# Patient Record
Sex: MALE | Race: WHITE
[De-identification: names, ages, dates, MRNs, and addresses within clinical notes are randomized per-mention and may not be internally consistent; named-entity substitution may affect disease eponyms.]

---

## 2017-11-27 LAB
ANION GAP SERPL CALC-SCNC: 11 MMOL/L (ref 8–16)
BUN SERPL-MCNC: 12 MG/DL (ref 7–18)
CALCIUM SERPL-MCNC: 8.4 MG/DL (ref 8.5–10.1)
CHLORIDE SERPL-SCNC: 101 MMOL/L (ref 98–107)
CO2 SERPL-SCNC: 26 MMOL/L (ref 21–32)
CREAT SERPL-MCNC: 1.3 MG/DL (ref 0.6–1.3)
ERYTHROCYTE [DISTWIDTH] IN BLOOD BY AUTOMATED COUNT: 14 % (ref 11.5–14.5)
GLUCOSE SERPL-MCNC: 99 MG/DL (ref 74–106)
HCT VFR BLD CALC: 44.1 % (ref 42–54)
HGB BLD-MCNC: 14.9 G/DL (ref 13.5–17.5)
MCH RBC QN AUTO: 30.7 PG (ref 26–34)
MCHC RBC AUTO-ENTMCNC: 33.8 G/DL (ref 31–37)
MCV RBC: 90.9 FL (ref 80–100)
OSMOLALITY SERPL CALC.SUM OF ELEC: 267 MOSM/KG (ref 275–300)
PLATELET # BLD: 347 10X3/UL (ref 130–400)
PMV BLD AUTO: 9.5 FL (ref 7.4–10.4)
POTASSIUM SERPL-SCNC: 4 MMOL/L (ref 3.5–5.1)
RBC # BLD AUTO: 4.85 10X6/UL (ref 4.2–6.1)
SODIUM SERPL-SCNC: 134 MMOL/L (ref 136–145)
WBC # BLD AUTO: 5.7 10X3/UL (ref 4.8–10.8)

## 2017-11-28 ENCOUNTER — HOSPITAL ENCOUNTER (INPATIENT)
Dept: HOSPITAL 84 - D.SDCHOLD | Age: 57
LOS: 9 days | Discharge: HOME | DRG: 331 | End: 2017-12-07
Attending: SURGERY | Admitting: SURGERY
Payer: COMMERCIAL

## 2017-11-28 VITALS
BODY MASS INDEX: 26.82 KG/M2 | BODY MASS INDEX: 26.82 KG/M2 | BODY MASS INDEX: 26.82 KG/M2 | HEIGHT: 74 IN | WEIGHT: 209 LBS | BODY MASS INDEX: 26.82 KG/M2

## 2017-11-28 VITALS — SYSTOLIC BLOOD PRESSURE: 125 MMHG | DIASTOLIC BLOOD PRESSURE: 83 MMHG

## 2017-11-28 VITALS — DIASTOLIC BLOOD PRESSURE: 74 MMHG | SYSTOLIC BLOOD PRESSURE: 120 MMHG

## 2017-11-28 VITALS — DIASTOLIC BLOOD PRESSURE: 87 MMHG | SYSTOLIC BLOOD PRESSURE: 110 MMHG

## 2017-11-28 VITALS — DIASTOLIC BLOOD PRESSURE: 78 MMHG | SYSTOLIC BLOOD PRESSURE: 95 MMHG

## 2017-11-28 VITALS — SYSTOLIC BLOOD PRESSURE: 137 MMHG | DIASTOLIC BLOOD PRESSURE: 88 MMHG

## 2017-11-28 VITALS — SYSTOLIC BLOOD PRESSURE: 109 MMHG | DIASTOLIC BLOOD PRESSURE: 73 MMHG

## 2017-11-28 VITALS — SYSTOLIC BLOOD PRESSURE: 109 MMHG | DIASTOLIC BLOOD PRESSURE: 71 MMHG

## 2017-11-28 VITALS — DIASTOLIC BLOOD PRESSURE: 80 MMHG | SYSTOLIC BLOOD PRESSURE: 111 MMHG

## 2017-11-28 VITALS — SYSTOLIC BLOOD PRESSURE: 132 MMHG | DIASTOLIC BLOOD PRESSURE: 85 MMHG
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
## 2017-11-28 DIAGNOSIS — K94.13: Primary | ICD-10-CM

## 2017-11-28 DIAGNOSIS — K66.0: ICD-10-CM

## 2017-11-28 DIAGNOSIS — R00.0: ICD-10-CM

## 2017-11-28 LAB
ANION GAP SERPL CALC-SCNC: 20.7 MMOL/L (ref 8–16)
BASOPHILS NFR BLD AUTO: 0.1 % (ref 0–2)
BASOPHILS NFR BLD AUTO: 0.1 % (ref 0–2)
BUN SERPL-MCNC: 8 MG/DL (ref 7–18)
CALCIUM SERPL-MCNC: 7.1 MG/DL (ref 8.5–10.1)
CHLORIDE SERPL-SCNC: 103 MMOL/L (ref 98–107)
CO2 SERPL-SCNC: 14.7 MMOL/L (ref 21–32)
CREAT SERPL-MCNC: 1.1 MG/DL (ref 0.6–1.3)
EOSINOPHIL NFR BLD: 0 % (ref 0–7)
EOSINOPHIL NFR BLD: 0.2 % (ref 0–7)
ERYTHROCYTE [DISTWIDTH] IN BLOOD BY AUTOMATED COUNT: 14.4 % (ref 11.5–14.5)
ERYTHROCYTE [DISTWIDTH] IN BLOOD BY AUTOMATED COUNT: 15.2 % (ref 11.5–14.5)
GLUCOSE SERPL-MCNC: 92 MG/DL (ref 74–106)
HCT VFR BLD CALC: 32.4 % (ref 42–54)
HCT VFR BLD CALC: 43.1 % (ref 42–54)
HGB BLD-MCNC: 10.4 G/DL (ref 13.5–17.5)
HGB BLD-MCNC: 14.3 G/DL (ref 13.5–17.5)
IMM GRANULOCYTES NFR BLD: 0.1 % (ref 0–5)
IMM GRANULOCYTES NFR BLD: 0.2 % (ref 0–5)
LYMPHOCYTES NFR BLD AUTO: 22.9 % (ref 15–50)
LYMPHOCYTES NFR BLD AUTO: 5.6 % (ref 15–50)
MCH RBC QN AUTO: 30 PG (ref 26–34)
MCH RBC QN AUTO: 30.3 PG (ref 26–34)
MCHC RBC AUTO-ENTMCNC: 32.1 G/DL (ref 31–37)
MCHC RBC AUTO-ENTMCNC: 33.2 G/DL (ref 31–37)
MCV RBC: 91.3 FL (ref 80–100)
MCV RBC: 93.4 FL (ref 80–100)
MONOCYTES NFR BLD: 3.7 % (ref 2–11)
MONOCYTES NFR BLD: 8.6 % (ref 2–11)
NEUTROPHILS NFR BLD AUTO: 72.9 % (ref 40–80)
NEUTROPHILS NFR BLD AUTO: 85.6 % (ref 40–80)
OSMOLALITY SERPL CALC.SUM OF ELEC: 265 MOSM/KG (ref 275–300)
PLATELET # BLD: 248 10X3/UL (ref 130–400)
PLATELET # BLD: 248 10X3/UL (ref 130–400)
PMV BLD AUTO: 10 FL (ref 7.4–10.4)
PMV BLD AUTO: 9.6 FL (ref 7.4–10.4)
POTASSIUM SERPL-SCNC: 4.4 MMOL/L (ref 3.5–5.1)
RBC # BLD AUTO: 3.47 10X6/UL (ref 4.2–6.1)
RBC # BLD AUTO: 4.72 10X6/UL (ref 4.2–6.1)
SODIUM SERPL-SCNC: 134 MMOL/L (ref 136–145)
WBC # BLD AUTO: 8.6 10X3/UL (ref 4.8–10.8)
WBC # BLD AUTO: 8.8 10X3/UL (ref 4.8–10.8)

## 2017-11-28 PROCEDURE — 0DQB0ZZ REPAIR ILEUM, OPEN APPROACH: ICD-10-PCS | Performed by: SURGERY

## 2017-11-28 PROCEDURE — 0D1B0Z4 BYPASS ILEUM TO CUTANEOUS, OPEN APPROACH: ICD-10-PCS | Performed by: SURGERY

## 2017-11-28 PROCEDURE — 0DNW0ZZ RELEASE PERITONEUM, OPEN APPROACH: ICD-10-PCS | Performed by: SURGERY

## 2017-11-28 NOTE — NUR
AISHA CONSULTED NUMEROUS TIMES ABOUT THE PATIENTS BP AND HEART
RATE. DR CERON ALSO AT BEDSIDE CHECKING ON THE PATIENT

## 2017-11-28 NOTE — NUR
DR LINTON HERE AT BEDSIDE ASSESING THE PATIENTS HEART RATE AND VITAL
SIGNS. DR LINTON ADMIN ESMOLOL 30MG IN RECOVERY 1750

## 2017-11-28 NOTE — NUR
PT ARRIVED TO ROOM AT 1810 FROM RECOVERY ROOM, , /86
SPO2 97% ON 3L NC, TEMP 98.2 ORALLY, HR SINUS TACH, PT ALERT AND ABLE TO VOICE
ALL NEEDS, RR EVEN AND UNLABORED COMPLAINING OF PAIN, RR NURSE STATED HE WOULD
TALK TO BREVING ABOUT PAIN MEDICATION, ABD DRESSINGS CDI, CESAR DRAIN COMPRESSED
WITH BLOODY DRAINAGE AND STOMA PINK WITH BAG IN PLACE, SCDS IN PLACE, PIV TO
RIGHT AND LEFT HAND BOTH PATENT, SIFUENTES DRAINING GREEN, RR STATED HE CAME FROM
[facility] WITH 330ML IN BAG AND WAS NOT EMPTIED AND NO UOP IN RR, NGT LIS WITH NO
OUTPUT IN RR, WILL CONTINUE TO MONITOR

## 2017-11-28 NOTE — NUR
DR CERON AT BEDSIDE GIVEN UPDATE.  UNCHANGED FROM ATIVAN ADM. ONE TIME
DOSE T ORDER FOR 5MG LOPRESSOR IV TO BE ADM. 50 G ALBUMIN TO BE ADM. WILL
CONTINUE TO MONITOR

## 2017-11-29 VITALS — SYSTOLIC BLOOD PRESSURE: 110 MMHG | DIASTOLIC BLOOD PRESSURE: 70 MMHG

## 2017-11-29 VITALS — SYSTOLIC BLOOD PRESSURE: 111 MMHG | DIASTOLIC BLOOD PRESSURE: 76 MMHG

## 2017-11-29 VITALS — DIASTOLIC BLOOD PRESSURE: 73 MMHG | SYSTOLIC BLOOD PRESSURE: 103 MMHG

## 2017-11-29 VITALS — DIASTOLIC BLOOD PRESSURE: 72 MMHG | SYSTOLIC BLOOD PRESSURE: 106 MMHG

## 2017-11-29 VITALS — DIASTOLIC BLOOD PRESSURE: 73 MMHG | SYSTOLIC BLOOD PRESSURE: 99 MMHG

## 2017-11-29 VITALS — SYSTOLIC BLOOD PRESSURE: 113 MMHG | DIASTOLIC BLOOD PRESSURE: 70 MMHG

## 2017-11-29 VITALS — SYSTOLIC BLOOD PRESSURE: 109 MMHG | DIASTOLIC BLOOD PRESSURE: 69 MMHG

## 2017-11-29 VITALS — DIASTOLIC BLOOD PRESSURE: 76 MMHG | SYSTOLIC BLOOD PRESSURE: 101 MMHG

## 2017-11-29 VITALS — SYSTOLIC BLOOD PRESSURE: 122 MMHG | DIASTOLIC BLOOD PRESSURE: 76 MMHG

## 2017-11-29 VITALS — SYSTOLIC BLOOD PRESSURE: 110 MMHG | DIASTOLIC BLOOD PRESSURE: 75 MMHG

## 2017-11-29 VITALS — SYSTOLIC BLOOD PRESSURE: 125 MMHG | DIASTOLIC BLOOD PRESSURE: 78 MMHG

## 2017-11-29 VITALS — SYSTOLIC BLOOD PRESSURE: 113 MMHG | DIASTOLIC BLOOD PRESSURE: 79 MMHG

## 2017-11-29 VITALS — SYSTOLIC BLOOD PRESSURE: 119 MMHG | DIASTOLIC BLOOD PRESSURE: 73 MMHG

## 2017-11-29 VITALS — SYSTOLIC BLOOD PRESSURE: 112 MMHG | DIASTOLIC BLOOD PRESSURE: 72 MMHG

## 2017-11-29 VITALS — SYSTOLIC BLOOD PRESSURE: 114 MMHG | DIASTOLIC BLOOD PRESSURE: 75 MMHG

## 2017-11-29 VITALS — DIASTOLIC BLOOD PRESSURE: 69 MMHG | SYSTOLIC BLOOD PRESSURE: 123 MMHG

## 2017-11-29 VITALS — SYSTOLIC BLOOD PRESSURE: 122 MMHG | DIASTOLIC BLOOD PRESSURE: 85 MMHG

## 2017-11-29 LAB
ALBUMIN SERPL-MCNC: 3.2 G/DL (ref 3.4–5)
ALP SERPL-CCNC: 31 U/L (ref 46–116)
ALT SERPL-CCNC: 22 U/L (ref 10–68)
ANION GAP SERPL CALC-SCNC: 13.3 MMOL/L (ref 8–16)
BASOPHILS NFR BLD AUTO: 0 % (ref 0–2)
BILIRUB SERPL-MCNC: 1.59 MG/DL (ref 0.2–1.3)
BUN SERPL-MCNC: 14 MG/DL (ref 7–18)
CALCIUM SERPL-MCNC: 8.4 MG/DL (ref 8.5–10.1)
CHLORIDE SERPL-SCNC: 103 MMOL/L (ref 98–107)
CO2 SERPL-SCNC: 24.4 MMOL/L (ref 21–32)
CREAT SERPL-MCNC: 1.9 MG/DL (ref 0.6–1.3)
EOSINOPHIL NFR BLD: 0 % (ref 0–7)
ERYTHROCYTE [DISTWIDTH] IN BLOOD BY AUTOMATED COUNT: 15.3 % (ref 11.5–14.5)
GLOBULIN SER-MCNC: 2.7 G/L
GLUCOSE SERPL-MCNC: 113 MG/DL (ref 74–106)
HCT VFR BLD CALC: 37.8 % (ref 42–54)
HGB BLD-MCNC: 12.5 G/DL (ref 13.5–17.5)
IMM GRANULOCYTES NFR BLD: 0.2 % (ref 0–5)
LYMPHOCYTES NFR BLD AUTO: 3.9 % (ref 15–50)
MAGNESIUM SERPL-MCNC: 1.3 MG/DL (ref 1.8–2.4)
MCH RBC QN AUTO: 29.9 PG (ref 26–34)
MCHC RBC AUTO-ENTMCNC: 33.1 G/DL (ref 31–37)
MCV RBC: 90.4 FL (ref 80–100)
MONOCYTES NFR BLD: 7.7 % (ref 2–11)
NEUTROPHILS NFR BLD AUTO: 88.2 % (ref 40–80)
OSMOLALITY SERPL CALC.SUM OF ELEC: 273 MOSM/KG (ref 275–300)
PHOSPHATE SERPL-MCNC: 3.8 MG/DL (ref 2.5–4.9)
PLATELET # BLD: 265 10X3/UL (ref 130–400)
PMV BLD AUTO: 10.1 FL (ref 7.4–10.4)
POTASSIUM SERPL-SCNC: 4.7 MMOL/L (ref 3.5–5.1)
PROT SERPL-MCNC: 5.9 G/DL (ref 6.4–8.2)
RBC # BLD AUTO: 4.18 10X6/UL (ref 4.2–6.1)
SODIUM SERPL-SCNC: 136 MMOL/L (ref 136–145)
WBC # BLD AUTO: 14.5 10X3/UL (ref 4.8–10.8)

## 2017-11-29 NOTE — NUR
1600-PT FAMILY AT THE BEDSIDE, ASKING IF PT CAN HAVE ICE CHIPS, INFORMED THEM
THAT AS OF THIS TIME, PT IS NPO, WILL CALL DR. CERON AND ASK.
1615-DR. CERON RETURNING PAGE, "OKAY TO HAVE ICE."

## 2017-11-29 NOTE — NUR
PT RESTING WITH EYES CLOSED, PAIN CONTROLLED BY PCA PUMP, REASSESSMENT
COMPLETED, SEE FLOW SHEET. ROOM FREE OF CLUTTER, BED LOCKED IN LOWEST
POSITION, CALL LIGHT IN REACH, WILL CONTINUE TO MONITOR PT.

## 2017-11-29 NOTE — NUR
REC'D LYING IN BED. ALERT AND ORIENTED X4. DENIED PAIN AT THIS TIME. DENIED
NEEDS AT THIS TIME. WIFE IS WANTING TO KNOW WHEN THE NGT CAN COME OUT. HAS CESAR
TO ABD, AND MIDLINE DRESSING. INSTRUSTED TO CALL IF NEEDED ANYTHING,
VERBALIZED UNDERSTANDING. NO DISTRESS NOTED. BED LOW, LOCKED, CALL LIGHT IN
REACH. WILL CONT TO MONITOR.

## 2017-11-29 NOTE — NUR
PT REPORT REC'D, PT CARE ASSUMED. PT AAOX4 SITTING UP IN BED, PT C/O PAIN
RATED "6/10", PT HAS PCA PUMP "THAT HELPS WITH THE PAIN." INSTRUCTED ON USE OF
PCA TO CONTROL PAIN, PT VERBALIZED AND DEMONSTRATED UNDERSTANDING OF PCA PUMP.
RIGHT HAND PIV WITH FLUIDS INFUSING, SEE FLOW SHEET, DRESSING CDI. LEFT WRIST
PIV S/L'ED, DRESSING CDI, NO REDDNESS OR SIGNS OF INFILTRATION. MIDABDOMINAL
INCISION, DRESSING CDI. COLOSTOMY WITH BLOODY DRAIANGE NOTED. CESAR DRAIN
COMPRESSED WITH BLOODY DRAINAGE, DRESSING CDI. SIFUENTES CATHETER FREE OF KINKS TO
GRAVITY WITH BLUE/GREEN URINE RETURN. SCD'S. SHIFT ASSESSMENT COMPLETED, SEE
FLOW SHEET. ROOM FREE OF CLUTTER, CALL LIGHT IN REACH, BED LOCKED IN LOWEST
POSITION, BED ALARM ACTIVE, WILL CONTINUE TO MONITOR PT.

## 2017-11-30 VITALS — SYSTOLIC BLOOD PRESSURE: 113 MMHG | DIASTOLIC BLOOD PRESSURE: 72 MMHG

## 2017-11-30 VITALS — SYSTOLIC BLOOD PRESSURE: 117 MMHG | DIASTOLIC BLOOD PRESSURE: 68 MMHG

## 2017-11-30 VITALS — SYSTOLIC BLOOD PRESSURE: 124 MMHG | DIASTOLIC BLOOD PRESSURE: 80 MMHG

## 2017-11-30 VITALS — SYSTOLIC BLOOD PRESSURE: 124 MMHG | DIASTOLIC BLOOD PRESSURE: 75 MMHG

## 2017-11-30 VITALS — SYSTOLIC BLOOD PRESSURE: 128 MMHG | DIASTOLIC BLOOD PRESSURE: 76 MMHG

## 2017-11-30 NOTE — NUR
PATIENT IN BED WITH EYES CLOSED RESTING QUIETLY.IV AND NGT INTACT. NO
COMPLAINTS. CALL LIGHT WITHIN REACH.

## 2017-11-30 NOTE — NUR
RECIEVED REPORT ANS CARE ASSUMED RESTIN IN BED CALL LIGHT IN REACH SIDE RAILS
UP X 2 NGT TO LIS NO OUTPUT AT THIS TIME.

## 2017-11-30 NOTE — NUR
PT LYING IN BED HEAD LIFTED A BIT, NGT IN LEFT NARE, EYES CLOSED EVEN RISE AND
FALL OF CHEST. PT SP AT BEDSIDE. CONTINUE WITH PLAN OF CARE

## 2017-11-30 NOTE — NUR
PT CALL LIGHT ON STATED COUGHING UP WHAT LOOKS LIKE COAGULATED BLOOD, WANTS TO
KNOW WHEN NGT CAN COME OUT. NO DRAINAGE ALL NIGHT OR TODAY. PUT IN PAGE FOR DR CERON

## 2017-11-30 NOTE — NUR
Patient Name: SNEHAL PATEL
Admission Status: Elective
Accout number: [id number]
Admission Date: 2017
: 1960
Admission Diagnosis:ENTEROSTOMY MALFUNCTION
Attending: TUAN CERON
Current LOS:  2
 
Anticipated DC Date: 2017
Planned Disposition: Home
Primary Insurance: BLUE CROSS TRUE BLUE PPO
 
 
Discharge Planning Comments:
CM MET WITH PATIENT AND WIFE (NICK) REGARDING D/C NEEDS AND PLANS. PATIENT
STATED HIS WIFE WILL DRIVE HIM HOME AND THERE ARE NO STEPS TO ENTER HOME BUT
ARE STAIRS IN HOME (HE DOES NOT USE). PATIENT IS INDEPENDENT WITH HIS CARE AND
HAS NO DME AT HOME. PATIENTS PCP IS DR. CHUNG AND PHARMACY IS [facility].
PATIENT DENIES NEED FOR HOME HEALTH AND WIFE AGREED. CM WILL CONTINUE TO
FOLLOW PATIENT WITH D/C NEEDS AND PLANS.
 
PCP  DR. SHELDON PLATA ON [employer]  [phone]
NICK (WIFE) [phone]
 
 
 
 
 
 
: Tessa Bassett
 
Is the patient Alert and Oriented? Yes  0
* How many steps to enter\exit or inside your home? 0  0
* PCP DR. CHUNG  0
* Pharmacy [facility]  0
* Preadmission Environment Home with Family  0
* ADLs Independent  0
* List name and contact numbers for known caregivers / representatives who
currently or will assist patient after discharge: HEALTHER (WIFE)
[phone]  0
* Community resources currently utilized None  0
* Additional services required to return to the preadmission environment? Yes
0
* Can the patient safely return to the preadmission environment? Yes  0
* Has this patient been hospitalized within the prior 30 days at any hospital?
No  0
    Grand Total:  0

## 2017-12-01 VITALS — DIASTOLIC BLOOD PRESSURE: 76 MMHG | SYSTOLIC BLOOD PRESSURE: 131 MMHG

## 2017-12-01 VITALS — SYSTOLIC BLOOD PRESSURE: 131 MMHG | DIASTOLIC BLOOD PRESSURE: 81 MMHG

## 2017-12-01 VITALS — SYSTOLIC BLOOD PRESSURE: 123 MMHG | DIASTOLIC BLOOD PRESSURE: 78 MMHG

## 2017-12-01 VITALS — SYSTOLIC BLOOD PRESSURE: 127 MMHG | DIASTOLIC BLOOD PRESSURE: 73 MMHG

## 2017-12-01 VITALS — DIASTOLIC BLOOD PRESSURE: 79 MMHG | SYSTOLIC BLOOD PRESSURE: 126 MMHG

## 2017-12-01 LAB
ALBUMIN SERPL-MCNC: 2.4 G/DL (ref 3.4–5)
ALP SERPL-CCNC: 61 U/L (ref 46–116)
ALT SERPL-CCNC: 19 U/L (ref 10–68)
ANION GAP SERPL CALC-SCNC: 9 MMOL/L (ref 8–16)
BILIRUB SERPL-MCNC: 1.9 MG/DL (ref 0.2–1.3)
BUN SERPL-MCNC: 18 MG/DL (ref 7–18)
CALCIUM SERPL-MCNC: 8.2 MG/DL (ref 8.5–10.1)
CHLORIDE SERPL-SCNC: 103 MMOL/L (ref 98–107)
CO2 SERPL-SCNC: 29.6 MMOL/L (ref 21–32)
CREAT SERPL-MCNC: 1.2 MG/DL (ref 0.6–1.3)
ERYTHROCYTE [DISTWIDTH] IN BLOOD BY AUTOMATED COUNT: 15.3 % (ref 11.5–14.5)
GLOBULIN SER-MCNC: 3.8 G/L
GLUCOSE SERPL-MCNC: 109 MG/DL (ref 74–106)
HCT VFR BLD CALC: 30.3 % (ref 42–54)
HGB BLD-MCNC: 10.1 G/DL (ref 13.5–17.5)
LYMPHOCYTES NFR BLD AUTO: 6.7 % (ref 15–50)
MAGNESIUM SERPL-MCNC: 1.9 MG/DL (ref 1.8–2.4)
MCH RBC QN AUTO: 29.8 PG (ref 26–34)
MCHC RBC AUTO-ENTMCNC: 33.3 G/DL (ref 31–37)
MCV RBC: 89.4 FL (ref 80–100)
NEUTROPHILS NFR BLD AUTO: 85.7 % (ref 40–80)
OSMOLALITY SERPL CALC.SUM OF ELEC: 278 MOSM/KG (ref 275–300)
PLATELET # BLD: 240 10X3/UL (ref 130–400)
PMV BLD AUTO: 10 FL (ref 7.4–10.4)
POTASSIUM SERPL-SCNC: 3.6 MMOL/L (ref 3.5–5.1)
PROT SERPL-MCNC: 6.2 G/DL (ref 6.4–8.2)
RBC # BLD AUTO: 3.39 10X6/UL (ref 4.2–6.1)
SODIUM SERPL-SCNC: 138 MMOL/L (ref 136–145)
WBC # BLD AUTO: 19.5 10X3/UL (ref 4.8–10.8)

## 2017-12-01 NOTE — NUR
REPORT RECEIVED AND CARE OF PT ASSUMED.  PT LYING IN SEMI TOWNSEND'S POSITION
VISITING WITH SPOUSE. IV IN RIGHT HAND PATENT WITH NS INFUSING  ML / HR.
DILAUDID PCA IN USE FOR PAIN CONTROL. WILL MONITOR FOR NEEDS. DISCUSSED WITH
PT NEED TO VOID WITHIN 8 HOURS OF SIFUENTES REMOVAL.

## 2017-12-01 NOTE — NUR
PT AOX4 RESP EVEN AND NONLABORED PT DENIES NEEDS AT THIS TIME IV TO RIGHT HAND
PATENT AND INTACT AT THIS TIME SRX2 BED AT LOWEST SETTING CALL LIGHT WITHIN
REACH WILL CONTINUE TO MONITOR

## 2017-12-02 VITALS — DIASTOLIC BLOOD PRESSURE: 81 MMHG | SYSTOLIC BLOOD PRESSURE: 135 MMHG

## 2017-12-02 VITALS — SYSTOLIC BLOOD PRESSURE: 135 MMHG | DIASTOLIC BLOOD PRESSURE: 46 MMHG

## 2017-12-02 VITALS — DIASTOLIC BLOOD PRESSURE: 80 MMHG | SYSTOLIC BLOOD PRESSURE: 143 MMHG

## 2017-12-02 VITALS — DIASTOLIC BLOOD PRESSURE: 78 MMHG | SYSTOLIC BLOOD PRESSURE: 129 MMHG

## 2017-12-02 VITALS — DIASTOLIC BLOOD PRESSURE: 80 MMHG | SYSTOLIC BLOOD PRESSURE: 138 MMHG

## 2017-12-02 VITALS — SYSTOLIC BLOOD PRESSURE: 124 MMHG | DIASTOLIC BLOOD PRESSURE: 76 MMHG

## 2017-12-02 NOTE — NUR
PT RESTING QUIETLY AT THIS TIME WITH NO MOANING OR FACIAL GRIMACING. WIFE IS
AT BEDSIDE.  CALL LIGHT WITHIN REACH.

## 2017-12-02 NOTE — NUR
OSTOMY APPLIANCE LEAKING. REMOVED AND CLEANSED STOMA AND SURROUNDING AREA.
NEW APPLIANCE PLACED. PT TOLERATED PROCEDURE WELL.

## 2017-12-02 NOTE — NUR
PCA DILAUDID DC'D. PCA MORPHINE 1-10-10 SETUP FOR PAIN CONTROL. NG TUBE
CLAMPED. INSERTED TO NOTIFY NURSE OF ANY NAUSEA. DENIES ANY NEEDS AT THIS
TIME.

## 2017-12-02 NOTE — NUR
ASSESSMENT COMPLETE. IV TO R HAND PATENT. NS INFUSING  CC/HR. PCA
DILAUDID 0.2-10-0 IN USE FOR PAIN CONTROL. NG TO LIS. GREEN DRAINAGE NOTED IN
CANISTER. DRESSING TO ABDOMEN C/D/I. ILEOSTOMY PATENT WITH LIQUID BROWN
DRAINAGE NOTED IN BAG. O2 2LNC IN USE. FAMILY AT BEDSIDE.

## 2017-12-02 NOTE — NUR
REPORT RECEIVED AND CARE OF PT ASSUMED. PT LYING IN SUPINE POSITION WATCHING
TV. IV IN RIGHT HAND PATENT WITH NS INFUSING AT 75 ML / HR. PCA W/ MORPHINE IN
USE FOR PAIN CONTROL. DRESSING ON ABDOMEN INTACT.  OSTOMY PINK AND MOIST WITH
DARK BLOOD IN COLLECTION BAG. WILL MONITOR FOR NEEDS. CALL LIGHT WITHIN REACH.

## 2017-12-03 VITALS — DIASTOLIC BLOOD PRESSURE: 83 MMHG | SYSTOLIC BLOOD PRESSURE: 142 MMHG

## 2017-12-03 VITALS — SYSTOLIC BLOOD PRESSURE: 139 MMHG | DIASTOLIC BLOOD PRESSURE: 79 MMHG

## 2017-12-03 VITALS — SYSTOLIC BLOOD PRESSURE: 139 MMHG | DIASTOLIC BLOOD PRESSURE: 75 MMHG

## 2017-12-03 VITALS — SYSTOLIC BLOOD PRESSURE: 119 MMHG | DIASTOLIC BLOOD PRESSURE: 65 MMHG

## 2017-12-03 VITALS — DIASTOLIC BLOOD PRESSURE: 74 MMHG | SYSTOLIC BLOOD PRESSURE: 141 MMHG

## 2017-12-03 NOTE — NUR
REPORT RECEIVED AND CARE OF PT ASSUMED. PT LYING IN SEMI TOWNSEND'S POSITION
WATCHING TV. IV IN RIGHT HAND PATENT WITH NS INFUSING AT 75 ML / HR; AND PCA /
MORPHINE IN USE FOR PAIN CONTROL. WILL MONITOR FOR NEEDS.

## 2017-12-03 NOTE — NUR
TOOK TORADOL AND ZOFRAN TO ROOM TO SEE IF PT STILL NEEDS FOR HA AND
NAUSEA...PT SLEEPING IN SUPINE POSITION. WILL HOLD MEDS FOR NOW AND CONTINUE
MONITORING FOR NEEDS. SPOUSE AT BEDSIDE.

## 2017-12-03 NOTE — NUR
ASSESSMENT COMPLETE. IV TO R HAND PATENT. NS INFUSING AT 75 CC/HR VIA PUMP.
PCA MORPHINE 1-10-10 IN USE FOR PAIN CONTROL. ILEOSTOMY PATENT WITH BROWN
LIQUID NOTED IN BAG. O2 2L NC. DRESSING TO ABDOMEN C/D/I. NG TO LIS. SMALL
AMOUNT OF CLEAR DRAINAGE NOTED IN TUBING.

## 2017-12-04 VITALS — DIASTOLIC BLOOD PRESSURE: 75 MMHG | SYSTOLIC BLOOD PRESSURE: 133 MMHG

## 2017-12-04 VITALS — SYSTOLIC BLOOD PRESSURE: 123 MMHG | DIASTOLIC BLOOD PRESSURE: 76 MMHG

## 2017-12-04 VITALS — DIASTOLIC BLOOD PRESSURE: 83 MMHG | SYSTOLIC BLOOD PRESSURE: 130 MMHG

## 2017-12-04 VITALS — DIASTOLIC BLOOD PRESSURE: 80 MMHG | SYSTOLIC BLOOD PRESSURE: 137 MMHG

## 2017-12-04 VITALS — SYSTOLIC BLOOD PRESSURE: 142 MMHG | DIASTOLIC BLOOD PRESSURE: 78 MMHG

## 2017-12-04 NOTE — NUR
PATIENT JUST GOT BACK TO HIS ROOM AFTER WALKING IN THE DIETRICH AND GETTING A CUP
OF BLACK COFFEE. HE IS SITTING IN THE CHAIR. NO SIGNS OF DISTRESS NOTED. ROOM
AIR. HE TOLD ME HE EMPTIED HIS OSTOMY AND LEFT THE STOOL IN THE CANNESTER,
THERE IS 250ML OF BROWN LIQUID STOOL. PATIENT DENIES OTHER NEEDS AT THIS TIME.

## 2017-12-04 NOTE — NUR
NUTRITION F/U
CHART REVIEWED. PT NPO SINCE ADMIT 11/28. MAY REQUIRE NUTRITION SUPPORT IF
UNABLE TO TOLERATE PO IN 24 TO 48 HRS.
RD FOLLOWING

## 2017-12-04 NOTE — NUR
PT REQUESTED TO HAVE NG TUBE REPLACED...RECEIVED ORDER FROM MD.  ATTEMPTED TO
PLACE NG AND PT COULD NOT TOLERATE, AND GOT NOSE BLEED IN LEFT NARE.  AFTER A
WHILE PT UP AND AMBULATING IN THE HALLWAY, AND STATING THAT MAYBE HE IS
STARTING TO FEEL BETTTER. WILL CONTINUE TO MONITOR.

## 2017-12-05 VITALS — DIASTOLIC BLOOD PRESSURE: 79 MMHG | SYSTOLIC BLOOD PRESSURE: 133 MMHG

## 2017-12-05 VITALS — SYSTOLIC BLOOD PRESSURE: 132 MMHG | DIASTOLIC BLOOD PRESSURE: 78 MMHG

## 2017-12-05 VITALS — DIASTOLIC BLOOD PRESSURE: 77 MMHG | SYSTOLIC BLOOD PRESSURE: 127 MMHG

## 2017-12-05 VITALS — SYSTOLIC BLOOD PRESSURE: 136 MMHG | DIASTOLIC BLOOD PRESSURE: 84 MMHG

## 2017-12-05 LAB
BASOPHILS NFR BLD AUTO: 0.4 % (ref 0–2)
EOSINOPHIL NFR BLD: 4.4 % (ref 0–7)
ERYTHROCYTE [DISTWIDTH] IN BLOOD BY AUTOMATED COUNT: 14.7 % (ref 11.5–14.5)
HCT VFR BLD CALC: 32 % (ref 42–54)
HGB BLD-MCNC: 10.7 G/DL (ref 13.5–17.5)
IMM GRANULOCYTES NFR BLD: 4 % (ref 0–5)
LYMPHOCYTES NFR BLD AUTO: 18.5 % (ref 15–50)
MCH RBC QN AUTO: 29.8 PG (ref 26–34)
MCHC RBC AUTO-ENTMCNC: 33.4 G/DL (ref 31–37)
MCV RBC: 89.1 FL (ref 80–100)
MONOCYTES NFR BLD: 12.1 % (ref 2–11)
NEUTROPHILS NFR BLD AUTO: 60.6 % (ref 40–80)
PLATELET # BLD: 413 10X3/UL (ref 130–400)
PMV BLD AUTO: 9.8 FL (ref 7.4–10.4)
RBC # BLD AUTO: 3.59 10X6/UL (ref 4.2–6.1)
WBC # BLD AUTO: 9.5 10X3/UL (ref 4.8–10.8)

## 2017-12-05 NOTE — NUR
PATIENT IN BED WITH IV INTACT. ILEOSTOMY DRAINING BROWN LIQUID WITH SOME SOLID
IN IT. SIFUENTES INTACT. CALL LIGHT WITHIN REACH.

## 2017-12-05 NOTE — NUR
PATIENT'S WIFE CAME OUT TO THE DESK AND SAID PATIENT'S OSTOMY IS LEAKING.
WENT IN ROOM WITH SUPPLIES. PATIENT WAS HOLDING A TOWEL OVER OSTOMY WAFFER,
SMALL AMOUNT OF OSTOMY OUTPUT ON TOWEL. EMPTIED WHAT WAS LEFT IN THE BAG,
100ML OF MAROON COLOR DRAINAGE. CHANGED OSTOMY APPLIANCE. PAGED .

## 2017-12-05 NOTE — NUR
PATIENT IV IN LEFT HAND CLOTTED OFF. RESTARTED IN LEFT AC BY JAVAD OLIVERA.
PATIENT TOLERATED WITH SMALL AMOUNT OF PAIN. FAMILY AT BEDSIDE. CALL LIGHT
WITHIN REACH.

## 2017-12-06 VITALS — DIASTOLIC BLOOD PRESSURE: 81 MMHG | SYSTOLIC BLOOD PRESSURE: 133 MMHG

## 2017-12-06 VITALS — SYSTOLIC BLOOD PRESSURE: 145 MMHG | DIASTOLIC BLOOD PRESSURE: 83 MMHG

## 2017-12-06 VITALS — SYSTOLIC BLOOD PRESSURE: 140 MMHG | DIASTOLIC BLOOD PRESSURE: 71 MMHG

## 2017-12-06 VITALS — SYSTOLIC BLOOD PRESSURE: 121 MMHG | DIASTOLIC BLOOD PRESSURE: 82 MMHG

## 2017-12-06 VITALS — SYSTOLIC BLOOD PRESSURE: 132 MMHG | DIASTOLIC BLOOD PRESSURE: 85 MMHG

## 2017-12-06 VITALS — SYSTOLIC BLOOD PRESSURE: 133 MMHG | DIASTOLIC BLOOD PRESSURE: 72 MMHG

## 2017-12-06 NOTE — NUR
PATIENT IS AWAKE, ALERT AND ORIENTED X'S 4. RESPIRATIONS ARE EVEN AND
UNLABORED ON ROOM AIR. AMBULATING IN THE DIETRICH, MAKING LAPS AROUND THE NURSE
STATION. HE DENIES NEEDS AT THIS TIME.

## 2017-12-06 NOTE — NUR
REPORT RECIEVED ASSUMED CARE. PATIENT IN BED WITH NO COMPLAINTS AT THIS TIME.
IV INTACT. CALL LIGHT WITHIN REACH.

## 2017-12-07 VITALS — SYSTOLIC BLOOD PRESSURE: 130 MMHG | DIASTOLIC BLOOD PRESSURE: 70 MMHG

## 2017-12-07 VITALS — SYSTOLIC BLOOD PRESSURE: 132 MMHG | DIASTOLIC BLOOD PRESSURE: 73 MMHG

## 2017-12-07 VITALS — DIASTOLIC BLOOD PRESSURE: 73 MMHG | SYSTOLIC BLOOD PRESSURE: 120 MMHG

## 2017-12-07 NOTE — NUR
LATE ENTRY: CM SPOKE WITH PATIENT BEFORE D/C AND HE STATED HE WIFE WILL DRIVE
HIM HOME AND HE DID NOT WANT HOME HEALTH. PATIENT HAD NO OTHER NEEDS FOR
DISCHARGE

## 2017-12-07 NOTE — NUR
Wound care assisted pt with ostomy appliance change d/t leakage.  A 4" wafer
was cut to 2-1/4" and applied over cleansed skin with cavilon wipe for
protectant.  Pt states he is comfortable with changing appliances as he has
had a colostomy for most of his life.  He has a supplier for home supplies.
Sent 3 (4") ostomy kits home with him for additional changes until he gets new
size in.

## 2017-12-09 ENCOUNTER — HOSPITAL ENCOUNTER (INPATIENT)
Dept: HOSPITAL 84 - D.ER | Age: 57
LOS: 3 days | Discharge: HOME | DRG: 863 | End: 2017-12-12
Attending: SURGERY | Admitting: SURGERY
Payer: COMMERCIAL

## 2017-12-09 VITALS
BODY MASS INDEX: 25.67 KG/M2 | WEIGHT: 200 LBS | HEIGHT: 74 IN | BODY MASS INDEX: 25.67 KG/M2 | WEIGHT: 200 LBS | HEIGHT: 74 IN | BODY MASS INDEX: 25.67 KG/M2 | BODY MASS INDEX: 25.67 KG/M2

## 2017-12-09 VITALS — DIASTOLIC BLOOD PRESSURE: 76 MMHG | SYSTOLIC BLOOD PRESSURE: 111 MMHG

## 2017-12-09 VITALS — SYSTOLIC BLOOD PRESSURE: 103 MMHG | DIASTOLIC BLOOD PRESSURE: 62 MMHG

## 2017-12-09 VITALS — SYSTOLIC BLOOD PRESSURE: 110 MMHG | DIASTOLIC BLOOD PRESSURE: 63 MMHG

## 2017-12-09 VITALS — SYSTOLIC BLOOD PRESSURE: 113 MMHG | DIASTOLIC BLOOD PRESSURE: 57 MMHG

## 2017-12-09 VITALS — DIASTOLIC BLOOD PRESSURE: 67 MMHG | SYSTOLIC BLOOD PRESSURE: 121 MMHG

## 2017-12-09 DIAGNOSIS — T81.4XXA: Primary | ICD-10-CM

## 2017-12-09 DIAGNOSIS — B96.1: ICD-10-CM

## 2017-12-09 DIAGNOSIS — K21.9: ICD-10-CM

## 2017-12-09 LAB
ALBUMIN SERPL-MCNC: 2.1 G/DL (ref 3.4–5)
ALP SERPL-CCNC: 276 U/L (ref 46–116)
ALT SERPL-CCNC: 30 U/L (ref 10–68)
ANION GAP SERPL CALC-SCNC: 10.8 MMOL/L (ref 8–16)
ANION GAP SERPL CALC-SCNC: 13.8 MMOL/L (ref 8–16)
BASOPHILS NFR BLD AUTO: 0.1 % (ref 0–2)
BASOPHILS NFR BLD AUTO: 0.1 % (ref 0–2)
BILIRUB SERPL-MCNC: 2.2 MG/DL (ref 0.2–1.3)
BUN SERPL-MCNC: 8 MG/DL (ref 7–18)
BUN SERPL-MCNC: 9 MG/DL (ref 7–18)
CALCIUM SERPL-MCNC: 8.3 MG/DL (ref 8.5–10.1)
CALCIUM SERPL-MCNC: 8.3 MG/DL (ref 8.5–10.1)
CHLORIDE SERPL-SCNC: 101 MMOL/L (ref 98–107)
CHLORIDE SERPL-SCNC: 98 MMOL/L (ref 98–107)
CO2 SERPL-SCNC: 23.8 MMOL/L (ref 21–32)
CO2 SERPL-SCNC: 26.9 MMOL/L (ref 21–32)
CREAT SERPL-MCNC: 0.8 MG/DL (ref 0.6–1.3)
CREAT SERPL-MCNC: 0.9 MG/DL (ref 0.6–1.3)
EOSINOPHIL NFR BLD: 1.3 % (ref 0–7)
EOSINOPHIL NFR BLD: 4.6 % (ref 0–7)
ERYTHROCYTE [DISTWIDTH] IN BLOOD BY AUTOMATED COUNT: 15.1 % (ref 11.5–14.5)
ERYTHROCYTE [DISTWIDTH] IN BLOOD BY AUTOMATED COUNT: 15.3 % (ref 11.5–14.5)
GLOBULIN SER-MCNC: 4.2 G/L
GLUCOSE SERPL-MCNC: 122 MG/DL (ref 74–106)
GLUCOSE SERPL-MCNC: 131 MG/DL (ref 74–106)
HCT VFR BLD CALC: 28.2 % (ref 42–54)
HCT VFR BLD CALC: 30.3 % (ref 42–54)
HGB BLD-MCNC: 10.5 G/DL (ref 13.5–17.5)
HGB BLD-MCNC: 9.9 G/DL (ref 13.5–17.5)
IMM GRANULOCYTES NFR BLD: 0.4 % (ref 0–5)
IMM GRANULOCYTES NFR BLD: 0.5 % (ref 0–5)
LYMPHOCYTES NFR BLD AUTO: 10.2 % (ref 15–50)
LYMPHOCYTES NFR BLD AUTO: 10.3 % (ref 15–50)
MAGNESIUM SERPL-MCNC: 1.9 MG/DL (ref 1.8–2.4)
MCH RBC QN AUTO: 29.9 PG (ref 26–34)
MCH RBC QN AUTO: 30.4 PG (ref 26–34)
MCHC RBC AUTO-ENTMCNC: 34.7 G/DL (ref 31–37)
MCHC RBC AUTO-ENTMCNC: 35.1 G/DL (ref 31–37)
MCV RBC: 86.3 FL (ref 80–100)
MCV RBC: 86.5 FL (ref 80–100)
MONOCYTES NFR BLD: 10.9 % (ref 2–11)
MONOCYTES NFR BLD: 8.7 % (ref 2–11)
NEUTROPHILS NFR BLD AUTO: 73.8 % (ref 40–80)
NEUTROPHILS NFR BLD AUTO: 79.1 % (ref 40–80)
OSMOLALITY SERPL CALC.SUM OF ELEC: 266 MOSM/KG (ref 275–300)
OSMOLALITY SERPL CALC.SUM OF ELEC: 270 MOSM/KG (ref 275–300)
PLATELET # BLD: 656 10X3/UL (ref 130–400)
PLATELET # BLD: 690 10X3/UL (ref 130–400)
PMV BLD AUTO: 9.7 FL (ref 7.4–10.4)
PMV BLD AUTO: 9.8 FL (ref 7.4–10.4)
POTASSIUM SERPL-SCNC: 2.6 MMOL/L (ref 3.5–5.1)
POTASSIUM SERPL-SCNC: 2.7 MMOL/L (ref 3.5–5.1)
PROT SERPL-MCNC: 6.3 G/DL (ref 6.4–8.2)
RBC # BLD AUTO: 3.26 10X6/UL (ref 4.2–6.1)
RBC # BLD AUTO: 3.51 10X6/UL (ref 4.2–6.1)
SODIUM SERPL-SCNC: 133 MMOL/L (ref 136–145)
SODIUM SERPL-SCNC: 136 MMOL/L (ref 136–145)
WBC # BLD AUTO: 12.3 10X3/UL (ref 4.8–10.8)
WBC # BLD AUTO: 17.4 10X3/UL (ref 4.8–10.8)

## 2017-12-09 NOTE — NUR
CHANGED PATIENT'S ADDOMINAL PACKING AND DRESSING. ALSO BROUGHT PATIENT CHICKEN
BROTH AND A DRINK PER HIS REQUEST. PATIENT DENIES OTHER NEEDS AT THIS TIME.
BED IN LOWEST POSITION AND CALL LIGHT WITHIN REACH. PATIENT'S PAIN CONTROLLED
3/10 AT THIS TIME. ENCOURAGED THE PATIENT TO CALL IF HE HAS NEEDS.

## 2017-12-10 VITALS — DIASTOLIC BLOOD PRESSURE: 65 MMHG | SYSTOLIC BLOOD PRESSURE: 103 MMHG

## 2017-12-10 VITALS — SYSTOLIC BLOOD PRESSURE: 103 MMHG | DIASTOLIC BLOOD PRESSURE: 62 MMHG

## 2017-12-10 VITALS — SYSTOLIC BLOOD PRESSURE: 124 MMHG | DIASTOLIC BLOOD PRESSURE: 71 MMHG

## 2017-12-10 VITALS — DIASTOLIC BLOOD PRESSURE: 68 MMHG | SYSTOLIC BLOOD PRESSURE: 110 MMHG

## 2017-12-10 VITALS — DIASTOLIC BLOOD PRESSURE: 63 MMHG | SYSTOLIC BLOOD PRESSURE: 106 MMHG

## 2017-12-10 VITALS — DIASTOLIC BLOOD PRESSURE: 70 MMHG | SYSTOLIC BLOOD PRESSURE: 110 MMHG

## 2017-12-10 LAB
ANION GAP SERPL CALC-SCNC: 12.5 MMOL/L (ref 8–16)
BASOPHILS NFR BLD AUTO: 0.1 % (ref 0–2)
BUN SERPL-MCNC: 6 MG/DL (ref 7–18)
CALCIUM SERPL-MCNC: 8.1 MG/DL (ref 8.5–10.1)
CHLORIDE SERPL-SCNC: 102 MMOL/L (ref 98–107)
CO2 SERPL-SCNC: 23.6 MMOL/L (ref 21–32)
CREAT SERPL-MCNC: 0.8 MG/DL (ref 0.6–1.3)
EOSINOPHIL NFR BLD: 5.6 % (ref 0–7)
ERYTHROCYTE [DISTWIDTH] IN BLOOD BY AUTOMATED COUNT: 15.3 % (ref 11.5–14.5)
GLUCOSE SERPL-MCNC: 125 MG/DL (ref 74–106)
HCT VFR BLD CALC: 27.8 % (ref 42–54)
HGB BLD-MCNC: 9.5 G/DL (ref 13.5–17.5)
IMM GRANULOCYTES NFR BLD: 0.4 % (ref 0–5)
LYMPHOCYTES NFR BLD AUTO: 13.6 % (ref 15–50)
MAGNESIUM SERPL-MCNC: 1.7 MG/DL (ref 1.8–2.4)
MCH RBC QN AUTO: 29.9 PG (ref 26–34)
MCHC RBC AUTO-ENTMCNC: 34.2 G/DL (ref 31–37)
MCV RBC: 87.4 FL (ref 80–100)
MONOCYTES NFR BLD: 12.3 % (ref 2–11)
NEUTROPHILS NFR BLD AUTO: 68 % (ref 40–80)
OSMOLALITY SERPL CALC.SUM OF ELEC: 268 MOSM/KG (ref 275–300)
PLATELET # BLD: 699 10X3/UL (ref 130–400)
PMV BLD AUTO: 9.4 FL (ref 7.4–10.4)
POTASSIUM SERPL-SCNC: 3.1 MMOL/L (ref 3.5–5.1)
RBC # BLD AUTO: 3.18 10X6/UL (ref 4.2–6.1)
SODIUM SERPL-SCNC: 135 MMOL/L (ref 136–145)
WBC # BLD AUTO: 9.8 10X3/UL (ref 4.8–10.8)

## 2017-12-10 NOTE — NUR
PATIENT AMBULATING AROUND HALLWAY. PATIENT APPEARS STEADY AND HAS NO VISIBLE
SIGNS OF DISTRESS. ENCOURAGED THE PATIENT TO CALL IF HE HAS NEEDS.

## 2017-12-11 VITALS — DIASTOLIC BLOOD PRESSURE: 63 MMHG | SYSTOLIC BLOOD PRESSURE: 104 MMHG

## 2017-12-11 VITALS — DIASTOLIC BLOOD PRESSURE: 60 MMHG | SYSTOLIC BLOOD PRESSURE: 128 MMHG

## 2017-12-11 VITALS — SYSTOLIC BLOOD PRESSURE: 112 MMHG | DIASTOLIC BLOOD PRESSURE: 67 MMHG

## 2017-12-11 VITALS — SYSTOLIC BLOOD PRESSURE: 109 MMHG | DIASTOLIC BLOOD PRESSURE: 53 MMHG

## 2017-12-11 VITALS — DIASTOLIC BLOOD PRESSURE: 52 MMHG | SYSTOLIC BLOOD PRESSURE: 124 MMHG

## 2017-12-11 LAB
ANION GAP SERPL CALC-SCNC: 13.8 MMOL/L (ref 8–16)
BASOPHILS NFR BLD AUTO: 0.3 % (ref 0–2)
BUN SERPL-MCNC: 7 MG/DL (ref 7–18)
CALCIUM SERPL-MCNC: 8.2 MG/DL (ref 8.5–10.1)
CHLORIDE SERPL-SCNC: 103 MMOL/L (ref 98–107)
CO2 SERPL-SCNC: 23.9 MMOL/L (ref 21–32)
CREAT SERPL-MCNC: 0.9 MG/DL (ref 0.6–1.3)
EOSINOPHIL NFR BLD: 7.6 % (ref 0–7)
ERYTHROCYTE [DISTWIDTH] IN BLOOD BY AUTOMATED COUNT: 15.4 % (ref 11.5–14.5)
GLUCOSE SERPL-MCNC: 106 MG/DL (ref 74–106)
HCT VFR BLD CALC: 28.1 % (ref 42–54)
HGB BLD-MCNC: 9.5 G/DL (ref 13.5–17.5)
IMM GRANULOCYTES NFR BLD: 0.4 % (ref 0–5)
LYMPHOCYTES NFR BLD AUTO: 16.7 % (ref 15–50)
MAGNESIUM SERPL-MCNC: 1.7 MG/DL (ref 1.8–2.4)
MCH RBC QN AUTO: 30.1 PG (ref 26–34)
MCHC RBC AUTO-ENTMCNC: 33.8 G/DL (ref 31–37)
MCV RBC: 88.9 FL (ref 80–100)
MONOCYTES NFR BLD: 13.3 % (ref 2–11)
NEUTROPHILS NFR BLD AUTO: 61.7 % (ref 40–80)
OSMOLALITY SERPL CALC.SUM OF ELEC: 271 MOSM/KG (ref 275–300)
PLATELET # BLD: 728 10X3/UL (ref 130–400)
PMV BLD AUTO: 9.8 FL (ref 7.4–10.4)
POTASSIUM SERPL-SCNC: 3.7 MMOL/L (ref 3.5–5.1)
RBC # BLD AUTO: 3.16 10X6/UL (ref 4.2–6.1)
SODIUM SERPL-SCNC: 137 MMOL/L (ref 136–145)
WBC # BLD AUTO: 7.8 10X3/UL (ref 4.8–10.8)

## 2017-12-11 NOTE — NUR
LATE ENTRY 0900
 DR VAUGHAN SPOKE WITH CM TODAY. REQUESTING A WOUND VAC FOR HOME. PATIENT DOES
 NOT PRESENTLY HAVE A WOUND VAC. WILL NEED WOUND MEASUREMENTS!!
 CM NOTED IN NURSING PROGRESS NOTES THAT PATIENT IS HAVING DIFFICULTY WITH HIS
 OSTOMY CARE. SPOKE WITH THE PRIMARY NURSE, MAGDA, TO OBTAIN A CONSULT FOR
WOUND
 CARE NURSE.
 ADVISED WOUND CARE NURSE THAT WE WOULD BE REQUESTING A CONSULT. REVIEWED
 NURSING NOTES REGARDING OSTOMY.
 AWAIT CLARIFICATION OF WOUMD VAC ORDER. DR VAUGHAN IS IN SURGERY.

## 2017-12-12 VITALS — SYSTOLIC BLOOD PRESSURE: 116 MMHG | DIASTOLIC BLOOD PRESSURE: 56 MMHG

## 2017-12-12 VITALS — SYSTOLIC BLOOD PRESSURE: 115 MMHG | DIASTOLIC BLOOD PRESSURE: 66 MMHG

## 2017-12-12 LAB
ANION GAP SERPL CALC-SCNC: 12.7 MMOL/L (ref 8–16)
BASOPHILS NFR BLD AUTO: 0.4 % (ref 0–2)
BUN SERPL-MCNC: 6 MG/DL (ref 7–18)
CALCIUM SERPL-MCNC: 8.6 MG/DL (ref 8.5–10.1)
CHLORIDE SERPL-SCNC: 101 MMOL/L (ref 98–107)
CO2 SERPL-SCNC: 24.9 MMOL/L (ref 21–32)
CREAT SERPL-MCNC: 0.9 MG/DL (ref 0.6–1.3)
EOSINOPHIL NFR BLD: 8.1 % (ref 0–7)
ERYTHROCYTE [DISTWIDTH] IN BLOOD BY AUTOMATED COUNT: 15.4 % (ref 11.5–14.5)
GLUCOSE SERPL-MCNC: 104 MG/DL (ref 74–106)
HCT VFR BLD CALC: 29.3 % (ref 42–54)
HGB BLD-MCNC: 9.8 G/DL (ref 13.5–17.5)
IMM GRANULOCYTES NFR BLD: 0.6 % (ref 0–5)
LYMPHOCYTES NFR BLD AUTO: 20.9 % (ref 15–50)
MCH RBC QN AUTO: 29.9 PG (ref 26–34)
MCHC RBC AUTO-ENTMCNC: 33.4 G/DL (ref 31–37)
MCV RBC: 89.3 FL (ref 80–100)
MONOCYTES NFR BLD: 16.6 % (ref 2–11)
NEUTROPHILS NFR BLD AUTO: 53.4 % (ref 40–80)
OSMOLALITY SERPL CALC.SUM OF ELEC: 267 MOSM/KG (ref 275–300)
PLATELET # BLD: 885 10X3/UL (ref 130–400)
PMV BLD AUTO: 9.8 FL (ref 7.4–10.4)
POTASSIUM SERPL-SCNC: 3.6 MMOL/L (ref 3.5–5.1)
RBC # BLD AUTO: 3.28 10X6/UL (ref 4.2–6.1)
SODIUM SERPL-SCNC: 135 MMOL/L (ref 136–145)
WBC # BLD AUTO: 7.2 10X3/UL (ref 4.8–10.8)

## 2017-12-12 NOTE — NUR
PT ASSESSMENT COMPLETE AWAKE AND ALERT ORINETED X 3 LUNGS CLAER BILATERALLY
HAS DRESING NOTED TO LOWER MIDLINE INCISION PACKING NOTED TO PUBIC AREA OF
INCISION. ILLEOSTOMY APPLIANCE IN PLACE NO LEAKING NOTED. VOICES ALL NEES TO
STAFF HOPING TO BE DISCHARGED TO HOME TODAY. WDILL AWAIT DR VAUGHAN ROUNDS.

## 2017-12-12 NOTE — NUR
PT GIVEN DISCHARGE INSTRUCTIONS EXPRESSED UNDERSTANDING OF ALL INSTRUCTIONS
AND FOLLOW UP CARE PIV DISCONTINUED. PT LEFT VIA AMBULATION WITH WIFE
ACCOMPANIED BY STAFF.

## 2017-12-12 NOTE — NUR
CM REASSESSMENT NOTE: PATIENT IS DISCHARGING HOME WITH [facility] FOR
DAILY WOUND PACKING AND OSTOMY CARE/WIFE DRIVING.

## 2017-12-28 NOTE — OP
PATIENT NAME:  SNEHAL PATEL                           MEDICAL RECORD: [id number]
:[birth year]                                             LOCATION:[id number]
                                                         ADMISSION DATE:17
SURGEON:  TUAN CERON MD            
 
 
DATE OF OPERATION:  2017
 
PREOPERATIVE DIAGNOSIS:  Retracted ileostomy.
 
POSTOPERATIVE DIAGNOSES:  Retracted ileostomy with extensive intra-abdominal
adhesions.
 
PROCEDURES:
1.  Exploratory laparotomy.
2.  Extensive adhesiolysis, 3 hours and 15 minutes.
3.  Ileostomy revision.
 
SURGEON:  Tuan Ceron MD
 
ASSISTANT:  None.
 
BLOOD LOSS:  750 cc.
 
ANESTHESIA:  General.
 
COMPLICATIONS:  None.
 
The risks, possible complications, and alternatives to the procedure were
explained to the patient.  He elected to proceed.
 
OPERATIVE COURSE:  The patient was conveyed to the operating room electively on
2017.  General anesthesia was induced by the anesthesia staff.  The
abdomen was sterilely prepped and draped.  A circular incision was accomplished
around the ileostomy.  I did some sharp and blunt dissection around the
ileostomy trying to deliver it.  I did some dissection within the peritoneal
cavity and was unable to deliver the ileostomy.  Through manipulation, it began
to degrade and looked dusky.  Therefore, I had to enter in the midline.  A lower
abdominal incision was accomplished and during the operation, it was necessary
to extend this incision in a cephalad direction almost to the xiphoid process.
 
I began a meticulous rather tedious sharp adhesiolysis.  Most of the adhesions
were grade III adhesions.  Three full thickness enterotomies occurred as well as
about 9 seromyotomies.  Once I had freed up the small bowel, I ran the small
bowel from the ligament of Treitz to the end of the ileum.  The tissue just
proximal to the ileostomy was very very thickened and was going be unsuitable
for a Louisa type of ileostomy.  I stapled across the ileum at this site with a
JULIAN-75 stapler.  I took down the mesentery utilizing the EnSeal device.
 
CRNA inserted the nasogastric tube.  I was able to identify this within the
stomach.
 
I began to run the small bowel again.  Seromyotomies were reinforced with a
tangential stapling of the TA-30 stapler.  The full-thickness enterotomies were
dealt with by performing a side-to-side anastomoses.  The small bowel was kept
in place in a side-to-side fashion with interrupted 3-0 Vicryls.  I advanced
anvils of the JULIAN-75 stapler and then fired.  The resulting enteric defect was
closed with a single firing of the TA 60 stapler.  All 3 of these enterotomies
 
 
 
OPERATIVE REPORT                               [id number]    SNEHAL PATEL         
 
 
were dealt with in this fashion.  I ran the small bowel in its entirety again 3
times.  I noted no full thickness enterotomies.  No seromyotomies that had not
been repaired.
 
I irrigated and aspirated.  There was no bleeding.  A 19-Tajik round Darryl
drain was brought out through the right lower quadrant.  The drain was sutured
to the skin with a 4-0 nylon.
 
I brought the cut ends of the ileum out through the defect in the right lower
quadrant.  I closed the midline fascia with a running #1 looped PDS.  The
subdermis was approximated with interrupted 3-0 Vicryls.  The skin was
approximated with metallic clips.
 
I excised the staple line.  I then performed a Louisa type of ileostomy with 3-0
Vicryl sutures.
 
A stomal appliance was then applied.
 
The patient was then extubated and conveyed to the post-anesthesia care unit
where he was quite tachycardic.  He is going to be admitted to the intensive
care unit for observation there overnight.
 
TRANSINT:[id number] Voice Confirmation ID: [id number] DOCUMENT ID: [id number]
                                           
                                           TUAN CERON MD            
 
 
 
Electronically Signed by TUAN CERON on 17 at 1326
 
 
 
 
 
 
 
 
 
 
 
 
 
 
 
 
 
 
CC:                                                             [id number]
DICTATION DATE: 17 1252     :     17 1419      DIS IN  
                                                                      17
[id number] [place] [address]

## 2018-01-23 ENCOUNTER — HOSPITAL ENCOUNTER (INPATIENT)
Dept: HOSPITAL 84 - D.ER | Age: 58
LOS: 3 days | Discharge: HOME | DRG: 387 | End: 2018-01-26
Attending: SURGERY | Admitting: SURGERY
Payer: COMMERCIAL

## 2018-01-23 VITALS
WEIGHT: 210.57 LBS | HEIGHT: 74 IN | BODY MASS INDEX: 27.02 KG/M2 | WEIGHT: 210.57 LBS | BODY MASS INDEX: 27.02 KG/M2 | HEIGHT: 74 IN

## 2018-01-23 VITALS — DIASTOLIC BLOOD PRESSURE: 68 MMHG | SYSTOLIC BLOOD PRESSURE: 108 MMHG

## 2018-01-23 DIAGNOSIS — K25.9: ICD-10-CM

## 2018-01-23 DIAGNOSIS — K50.011: Primary | ICD-10-CM

## 2018-01-23 DIAGNOSIS — K29.60: ICD-10-CM

## 2018-01-23 DIAGNOSIS — K29.80: ICD-10-CM

## 2018-01-23 DIAGNOSIS — Z93.2: ICD-10-CM

## 2018-01-23 LAB
ALBUMIN SERPL-MCNC: 3.2 G/DL (ref 3.4–5)
ALP SERPL-CCNC: 51 U/L (ref 46–116)
ALT SERPL-CCNC: 21 U/L (ref 10–68)
ANION GAP SERPL CALC-SCNC: 12.9 MMOL/L (ref 8–16)
APTT BLD: 29.6 SECONDS (ref 22.8–39.4)
BASOPHILS NFR BLD AUTO: 0.6 % (ref 0–2)
BILIRUB SERPL-MCNC: 0.27 MG/DL (ref 0.2–1.3)
BUN SERPL-MCNC: 16 MG/DL (ref 7–18)
CALCIUM SERPL-MCNC: 8.3 MG/DL (ref 8.5–10.1)
CHLORIDE SERPL-SCNC: 105 MMOL/L (ref 98–107)
CO2 SERPL-SCNC: 24.6 MMOL/L (ref 21–32)
CREAT SERPL-MCNC: 1.1 MG/DL (ref 0.6–1.3)
EOSINOPHIL NFR BLD: 5.4 % (ref 0–7)
ERYTHROCYTE [DISTWIDTH] IN BLOOD BY AUTOMATED COUNT: 14 % (ref 11.5–14.5)
GLOBULIN SER-MCNC: 3.1 G/L
GLUCOSE SERPL-MCNC: 95 MG/DL (ref 74–106)
HCT VFR BLD CALC: 30.6 % (ref 42–54)
HGB BLD-MCNC: 9.8 G/DL (ref 13.5–17.5)
IMM GRANULOCYTES NFR BLD: 0.2 % (ref 0–5)
INR PPP: 1.05 (ref 0.85–1.17)
LYMPHOCYTES NFR BLD AUTO: 31.5 % (ref 15–50)
MCH RBC QN AUTO: 27.9 PG (ref 26–34)
MCHC RBC AUTO-ENTMCNC: 32 G/DL (ref 31–37)
MCV RBC: 87.2 FL (ref 80–100)
MONOCYTES NFR BLD: 10.6 % (ref 2–11)
NEUTROPHILS NFR BLD AUTO: 51.7 % (ref 40–80)
OSMOLALITY SERPL CALC.SUM OF ELEC: 278 MOSM/KG (ref 275–300)
PLATELET # BLD: 333 10X3/UL (ref 130–400)
PMV BLD AUTO: 9.6 FL (ref 7.4–10.4)
POTASSIUM SERPL-SCNC: 3.5 MMOL/L (ref 3.5–5.1)
PROT SERPL-MCNC: 6.3 G/DL (ref 6.4–8.2)
PROTHROMBIN TIME: 13.3 SECONDS (ref 11.6–15)
RBC # BLD AUTO: 3.51 10X6/UL (ref 4.2–6.1)
SODIUM SERPL-SCNC: 139 MMOL/L (ref 136–145)
WBC # BLD AUTO: 6.5 10X3/UL (ref 4.8–10.8)

## 2018-01-24 VITALS — SYSTOLIC BLOOD PRESSURE: 104 MMHG | DIASTOLIC BLOOD PRESSURE: 62 MMHG

## 2018-01-24 VITALS — SYSTOLIC BLOOD PRESSURE: 106 MMHG | DIASTOLIC BLOOD PRESSURE: 74 MMHG

## 2018-01-24 VITALS — SYSTOLIC BLOOD PRESSURE: 110 MMHG | DIASTOLIC BLOOD PRESSURE: 68 MMHG

## 2018-01-24 VITALS — DIASTOLIC BLOOD PRESSURE: 51 MMHG | SYSTOLIC BLOOD PRESSURE: 84 MMHG

## 2018-01-24 VITALS — SYSTOLIC BLOOD PRESSURE: 103 MMHG | DIASTOLIC BLOOD PRESSURE: 63 MMHG

## 2018-01-24 VITALS — SYSTOLIC BLOOD PRESSURE: 111 MMHG | DIASTOLIC BLOOD PRESSURE: 65 MMHG

## 2018-01-24 VITALS — DIASTOLIC BLOOD PRESSURE: 64 MMHG | SYSTOLIC BLOOD PRESSURE: 93 MMHG

## 2018-01-24 VITALS — DIASTOLIC BLOOD PRESSURE: 63 MMHG | SYSTOLIC BLOOD PRESSURE: 108 MMHG

## 2018-01-24 VITALS — SYSTOLIC BLOOD PRESSURE: 100 MMHG | DIASTOLIC BLOOD PRESSURE: 62 MMHG

## 2018-01-24 VITALS — DIASTOLIC BLOOD PRESSURE: 66 MMHG | SYSTOLIC BLOOD PRESSURE: 101 MMHG

## 2018-01-24 VITALS — DIASTOLIC BLOOD PRESSURE: 69 MMHG | SYSTOLIC BLOOD PRESSURE: 108 MMHG

## 2018-01-24 VITALS — DIASTOLIC BLOOD PRESSURE: 69 MMHG | SYSTOLIC BLOOD PRESSURE: 110 MMHG

## 2018-01-25 VITALS — SYSTOLIC BLOOD PRESSURE: 100 MMHG | DIASTOLIC BLOOD PRESSURE: 64 MMHG

## 2018-01-25 VITALS — DIASTOLIC BLOOD PRESSURE: 73 MMHG | SYSTOLIC BLOOD PRESSURE: 119 MMHG

## 2018-01-25 VITALS — SYSTOLIC BLOOD PRESSURE: 147 MMHG | DIASTOLIC BLOOD PRESSURE: 87 MMHG

## 2018-01-25 VITALS — SYSTOLIC BLOOD PRESSURE: 117 MMHG | DIASTOLIC BLOOD PRESSURE: 72 MMHG

## 2018-01-25 VITALS — DIASTOLIC BLOOD PRESSURE: 69 MMHG | SYSTOLIC BLOOD PRESSURE: 120 MMHG

## 2018-01-25 VITALS — DIASTOLIC BLOOD PRESSURE: 57 MMHG | SYSTOLIC BLOOD PRESSURE: 100 MMHG

## 2018-01-25 VITALS — SYSTOLIC BLOOD PRESSURE: 100 MMHG | DIASTOLIC BLOOD PRESSURE: 57 MMHG

## 2018-01-25 PROCEDURE — 0DB88ZX EXCISION OF SMALL INTESTINE, VIA NATURAL OR ARTIFICIAL OPENING ENDOSCOPIC, DIAGNOSTIC: ICD-10-PCS | Performed by: SURGERY

## 2018-01-25 PROCEDURE — 0DB78ZX EXCISION OF STOMACH, PYLORUS, VIA NATURAL OR ARTIFICIAL OPENING ENDOSCOPIC, DIAGNOSTIC: ICD-10-PCS | Performed by: SURGERY

## 2018-01-26 VITALS — DIASTOLIC BLOOD PRESSURE: 69 MMHG | SYSTOLIC BLOOD PRESSURE: 123 MMHG

## 2018-01-26 VITALS — SYSTOLIC BLOOD PRESSURE: 116 MMHG | DIASTOLIC BLOOD PRESSURE: 72 MMHG

## 2018-01-26 VITALS — SYSTOLIC BLOOD PRESSURE: 112 MMHG | DIASTOLIC BLOOD PRESSURE: 70 MMHG

## 2018-01-26 VITALS — DIASTOLIC BLOOD PRESSURE: 68 MMHG | SYSTOLIC BLOOD PRESSURE: 112 MMHG

## 2018-10-05 ENCOUNTER — HOSPITAL ENCOUNTER (OUTPATIENT)
Dept: HOSPITAL 84 - D.LAB | Age: 58
Discharge: HOME | End: 2018-10-05
Attending: UROLOGY
Payer: COMMERCIAL

## 2018-10-05 VITALS — BODY MASS INDEX: 25.1 KG/M2

## 2018-10-05 DIAGNOSIS — N50.9: Primary | ICD-10-CM

## 2018-10-06 LAB
AFP-TM SERPL-MCNC: 5.9 NG/ML (ref 0–8.3)
HCG INTACT+B SERPL-ACNC: <1 MIU/ML (ref 0–3)

## 2018-10-09 ENCOUNTER — HOSPITAL ENCOUNTER (OUTPATIENT)
Dept: HOSPITAL 84 - D.US | Age: 58
Discharge: HOME | End: 2018-10-09
Attending: UROLOGY
Payer: COMMERCIAL

## 2018-10-09 VITALS — BODY MASS INDEX: 25.1 KG/M2

## 2018-10-09 DIAGNOSIS — N50.89: Primary | ICD-10-CM

## 2019-01-29 LAB
ERYTHROCYTE [DISTWIDTH] IN BLOOD BY AUTOMATED COUNT: 14.6 % (ref 11.5–14.5)
HCT VFR BLD CALC: 41.9 % (ref 42–54)
HGB BLD-MCNC: 14.4 G/DL (ref 13.5–17.5)
MCH RBC QN AUTO: 31.4 PG (ref 26–34)
MCHC RBC AUTO-ENTMCNC: 34.4 G/DL (ref 31–37)
MCV RBC: 91.3 FL (ref 80–100)
PLATELET # BLD: 403 10X3/UL (ref 130–400)
PMV BLD AUTO: 9.3 FL (ref 7.4–10.4)
RBC # BLD AUTO: 4.59 10X6/UL (ref 4.2–6.1)
WBC # BLD AUTO: 6.1 10X3/UL (ref 4.8–10.8)

## 2019-01-31 ENCOUNTER — HOSPITAL ENCOUNTER (OUTPATIENT)
Dept: HOSPITAL 84 - D.OPS | Age: 59
Discharge: HOME | End: 2019-01-31
Attending: UROLOGY
Payer: COMMERCIAL

## 2019-01-31 VITALS — BODY MASS INDEX: 27.98 KG/M2 | HEIGHT: 74 IN | WEIGHT: 218 LBS

## 2019-01-31 VITALS — DIASTOLIC BLOOD PRESSURE: 67 MMHG | SYSTOLIC BLOOD PRESSURE: 110 MMHG

## 2019-01-31 DIAGNOSIS — N50.3: ICD-10-CM

## 2019-01-31 DIAGNOSIS — N43.3: Primary | ICD-10-CM

## 2019-01-31 DIAGNOSIS — Z01.812: ICD-10-CM

## 2019-01-31 NOTE — NUR
TOLERATING DIET. RELATES PAIN 8/10. RELATES DOES NOT NEED ANYTHING
FOR PAIN. RELATES HE HAS A HIGH PAIN TOLERANCE. DRESSING CDI TO
SCROTAL DRESSING. FAMILY AT BEDSIDE.

## 2019-01-31 NOTE — OP
PATIENT NAME:  SNEHAL PATEL                           MEDICAL RECORD: [id number]
:[birth year]                                             LOCATION:[facility]          
                                                         ADMISSION DATE:        
SURGEON:  DARREN ROSARIO MD              
 
 
DATE OF OPERATION:  2019
 
SURGEON:  Darren Rosario MD
 
ANESTHESIA:  General anesthesia by Darren Garcia MD.
 
DIAGNOSES:  Left hydrocele, left epididymal cyst.
 
PROCEDURE:  Left hydrocelectomy by Jaboulay procedure, excision of left
epididymal cyst.
 
FINDINGS:  Left hydrocele, left epididymal cyst about 1 cm in diameter.
 
SPECIMENS:  Left hydrocele sac, left epididymal cyst.
 
ESTIMATED BLOOD LOSS:  Minimal.
 
CLINICAL HISTORY:  This is a 59-year-old male whom I initially saw in 2018 for a left paratesticular mass.  Tumor markers for testicular cancer were
negative.  A scrotal ultrasound was performed and showed bilateral hydroceles,
bilateral epididymal cysts with a complex cyst being in the left epididymis. 
There is no evidence of testicular torsion or testicular mass.  There are some
scrotal pearls within the scrotum.  At that time, he was not having any symptoms
and therefore, we left everything alone.  In the interim, he has now developed
aching pain in the left side.  The left hydrocele has grown in size.  He would
like to have the left hydrocele drained and as well he would like to have the
left epididymal mass removed.  HE IS ALLERGIC TO PENICILLIN, KEFLEX AND
AUGMENTIN AS WELL AS IODINE AND FISH OIL.  He was given Levaquin IV on call to
the OR.
 
DESCRIPTION OF PROCEDURE:  The patient was given induction of general anesthesia
in supine position.  He was then shaved, prepped, and draped.  A 3 cm long
incision was made in the median rhaphe of the scrotum.  I went down to the
dartos layer with the Bovie.  Finally, we got to the tunica vaginalis.  A small
opening was made here with Metzenbaum scissors and we had hydrocele fluid
seeping out.  Hemostats were placed on either edge of the opening and then the
opening was further lengthened using the Metzenbaum scissors.  This allowed
virtually all of the hydrocele fluid to be drained out.  The testicle was then
everted out of the left hemiscrotum.  We noted a nodular mass sticking right up
from the head of the epididymis about 1 cm in diameter.  This was the epididymal
cyst.  This was excised by undermining it using the Bovie.  The cyst did not
rupture during the excision process.  This was sent to pathology in formalin. 
The site of the excision was closed using running 3-0 Vicryl.  We then used the
Bovie to trim off the excess length of the tunica vaginalis, which formed the
hydrocele sac.  The 2 sides were then reapproximated posterior to the cord using
running 3-0 Vicryl.  This is the Jaboulay procedure.  The testicle was then
placed back into his left hemiscrotum with the correct orientation of the
lateral versus medial.  The dartos fascia was reapproximated using running 3-0
Vicryl.  The scrotal skin was reapproximated using simple interrupted 4-0
Monocryl.  Fluffs and mesh panties were then given to the patient.  Prior to
putting the dressings on, I infiltrated the area around the incision with 0.25%
Marcaine with epinephrine for local anesthetic.  I will see the patient in
 
 
 
OPERATIVE REPORT                               [id number]    SNEHAL PATEL         
 
 
followup in 2 weeks' time.
 
TRANSINT:[id number] Voice Confirmation ID: [id number] DOCUMENT ID: [id number]
                                           
                                           DARREN ROSARIO MD              
 
 
 
Electronically Signed by DARREN ROSARIO on 19 at 1534
 
 
 
 
 
 
 
 
 
 
 
 
 
 
 
 
 
 
 
 
 
 
 
 
 
 
 
 
 
 
 
 
 
 
 
 
 
 
CC:                                                             [id number]
DICTATION DATE: 19 1301     :     19 1412      [facility]                                          
[address] [place] [address]

## 2022-12-31 NOTE — NUR
Assume care @ 1845  AO X4, Verbally responsive, on 4   Denies pain. Max assist with care  BLE dressing intact  Incontinent. Lia Memos ordered. QID accu check. Insulin as ordered.   Fall and safety precautions in place CHANGED PATIENT'S ABDOMINAL PACKING